# Patient Record
Sex: MALE | Race: WHITE | NOT HISPANIC OR LATINO | URBAN - METROPOLITAN AREA
[De-identification: names, ages, dates, MRNs, and addresses within clinical notes are randomized per-mention and may not be internally consistent; named-entity substitution may affect disease eponyms.]

---

## 2017-10-08 ENCOUNTER — EMERGENCY (EMERGENCY)
Facility: HOSPITAL | Age: 22
LOS: 1 days | Discharge: HOME | End: 2017-10-08
Admitting: EMERGENCY MEDICINE
Payer: COMMERCIAL

## 2017-10-08 VITALS
OXYGEN SATURATION: 97 % | RESPIRATION RATE: 17 BRPM | SYSTOLIC BLOOD PRESSURE: 131 MMHG | TEMPERATURE: 98 F | DIASTOLIC BLOOD PRESSURE: 77 MMHG | HEART RATE: 86 BPM
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
## 2017-10-08 DIAGNOSIS — S01.511A LACERATION WITHOUT FOREIGN BODY OF LIP, INITIAL ENCOUNTER: ICD-10-CM
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
## 2017-10-08 PROCEDURE — 12051 INTMD RPR FACE/MM 2.5 CM/<: CPT

## 2017-10-08 PROCEDURE — 99284 EMERGENCY DEPT VISIT MOD MDM: CPT | Mod: 25

## 2017-10-08 PROCEDURE — 99285 EMERGENCY DEPT VISIT HI MDM: CPT | Mod: 25

## 2017-10-08 NOTE — CONSULT NOTE ADULT - ASSESSMENT
1 cm laceration to left upper lip, crossing vermillion, to the depth of orbicularis. 3mm superficial intra-oral tooth johnathan. No dental injury. No other signs of facial trauma.

## 2017-10-08 NOTE — ED ADULT NURSE REASSESSMENT NOTE - NS ED NURSE REASSESS COMMENT FT1
Pt left ER without signing discharge papers and repeat vital signs being performed. Pt was observed walking with steady gait with friend

## 2017-10-08 NOTE — ED ADULT TRIAGE NOTE - ARRIVAL INFO ADDITIONAL COMMENTS
Pt walked into ED with c/o of left upper laceration with mild bleeding. ONset was an hour ago. Pt states he had 8 beers and was climbing a fence, he tripped and fell. Broke the fall with his hands but hit his left side of the face after. Pt. denies LOC, HA, numbness, tingling, AMS, confusion. Pt walked into ED with c/o of left upper laceration with mild bleeding. ONset was an hour ago. Pt states he had 8 beers and was climbing a fence, he tripped and fell. Broke the fall with his hands but hit his left side of the face after. Pt. denies LOC, HA, numbness, tingling, AMS, confusion. Pt is up to date with tetanus

## 2017-10-08 NOTE — CONSULT NOTE ADULT - SUBJECTIVE AND OBJECTIVE BOX
Patient left a bar with friends and attempted to jump over a fence; he caught his foot and fell striking face on sidewalk, sustaining a laceration to the left upper lip.  Occurred 5-6 hours ago.  Denies LOC.  No headache, no N/V.  Has good recollection of the event.  No neck or back pain.  Last tetanus shot was about a year and a half ago.

## 2017-10-08 NOTE — ED PROVIDER NOTE - OBJECTIVE STATEMENT
pt had left a bar with friends and attempted to jump over a fence; he caught his foot and fell striking face on sidewalk, sustaining a laceration to the upper lip.  Occurred 4-5 hours ago.  Denies LOC.  No headache, no N/V.  Has good recollection of the event.  No neck or back pain.  Last tetanus shot was about a year and a half ago.

## 2017-10-08 NOTE — ED PROVIDER NOTE - MEDICAL DECISION MAKING DETAILS
Facial trauma occurred 4-5 hrs ago.  No s/s of serious head injury.  Sustained laceration through vermillion border; spoke with patient's mom and they are requesting plastic surgeon.

## 2017-10-08 NOTE — CONSULT NOTE ADULT - PROBLEM SELECTOR RECOMMENDATION 9
Irrigation and closure. Keep dry for 24 hours, then gently cleanse with warm soap and water BID. Keep moist with neosporin BID-TID. Suture removal 5-7 days (ok with local MD as from out of town, but provided my info for emergencies). Discussed signs of infection.

## 2017-10-08 NOTE — ED PROVIDER NOTE - PHYSICAL EXAMINATION
CONSTITUTIONAL: WD,WN. NAD.    SKIN: Normal color and turgor. No rash.    HEAD: NC/AT. No periorbital ecchymosis.  No Elaine sign.    EYES: Conjunctiva clear. EOMI. PERRL.    ENT: 1 cm laceration through the vermillion border of left upper lip.  Small intraoral lac.  No dental injury.  Airway patent, OP clear. Nasal mucosa clear, no rhinorrhea. No otorrhea or hemotympanum.  RESPIRATORY:  Breathing non-labored. No retractions or accessory muscle use.  Lungs CTA bilat.  CARDIOVASCULAR:  RRR, S1S2. No M/R/G.      GI:  Abdomen soft, nontender.    MSK: Neck supple with painless ROM.  No midline neck tenderness.  No extremity edema or tenderness.  No joint swelling or ROM limitation.  NEURO: Alert and oriented; CN II-XII intact.  SALDAÑA with 5/5 strength.  Steady gait.  Speech clear.

## 2017-10-08 NOTE — ED PROVIDER NOTE - PROGRESS NOTE DETAILS
d/w Dr Yo Vega, will see patient in ED shortly. Wound repair completed.  Alert and oriented, no complaints.  Stable for DC. pt left without discharge papers
